# Patient Record
Sex: FEMALE | Race: BLACK OR AFRICAN AMERICAN | NOT HISPANIC OR LATINO
[De-identification: names, ages, dates, MRNs, and addresses within clinical notes are randomized per-mention and may not be internally consistent; named-entity substitution may affect disease eponyms.]

---

## 2019-03-25 ENCOUNTER — APPOINTMENT (OUTPATIENT)
Dept: INTERNAL MEDICINE | Facility: CLINIC | Age: 59
End: 2019-03-25
Payer: COMMERCIAL

## 2019-03-25 VITALS
BODY MASS INDEX: 32.72 KG/M2 | WEIGHT: 187 LBS | HEIGHT: 63.5 IN | DIASTOLIC BLOOD PRESSURE: 84 MMHG | RESPIRATION RATE: 16 BRPM | SYSTOLIC BLOOD PRESSURE: 150 MMHG

## 2019-03-25 DIAGNOSIS — L29.0 PRURITUS ANI: ICD-10-CM

## 2019-03-25 DIAGNOSIS — R23.1 PALLOR: ICD-10-CM

## 2019-03-25 PROCEDURE — 99214 OFFICE O/P EST MOD 30 MIN: CPT

## 2019-03-25 NOTE — PLAN
[FreeTextEntry1] : hydrocortisone 1% applied prn\par at home tape test at night\par \par monitor left leg rash (occurred over past few weeks)- if no improvement- f/u with us for further bw or derm

## 2019-03-25 NOTE — HISTORY OF PRESENT ILLNESS
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

## 2019-03-25 NOTE — PHYSICAL EXAM

## 2019-11-20 ENCOUNTER — MEDICATION RENEWAL (OUTPATIENT)
Age: 59
End: 2019-11-20

## 2019-11-26 ENCOUNTER — MED ADMIN CHARGE (OUTPATIENT)
Age: 59
End: 2019-11-26

## 2019-11-26 ENCOUNTER — APPOINTMENT (OUTPATIENT)
Dept: INTERNAL MEDICINE | Facility: CLINIC | Age: 59
End: 2019-11-26
Payer: COMMERCIAL

## 2019-11-26 VITALS
WEIGHT: 195.97 LBS | DIASTOLIC BLOOD PRESSURE: 99 MMHG | HEIGHT: 63.5 IN | SYSTOLIC BLOOD PRESSURE: 161 MMHG | RESPIRATION RATE: 16 BRPM | TEMPERATURE: 98.2 F | BODY MASS INDEX: 34.29 KG/M2 | OXYGEN SATURATION: 100 % | HEART RATE: 77 BPM

## 2019-11-26 VITALS — SYSTOLIC BLOOD PRESSURE: 148 MMHG | DIASTOLIC BLOOD PRESSURE: 90 MMHG

## 2019-11-26 DIAGNOSIS — Z23 ENCOUNTER FOR IMMUNIZATION: ICD-10-CM

## 2019-11-26 PROCEDURE — 99214 OFFICE O/P EST MOD 30 MIN: CPT | Mod: 25

## 2019-11-26 PROCEDURE — 90686 IIV4 VACC NO PRSV 0.5 ML IM: CPT

## 2019-11-26 PROCEDURE — 36415 COLL VENOUS BLD VENIPUNCTURE: CPT

## 2019-11-26 PROCEDURE — 90471 IMMUNIZATION ADMIN: CPT

## 2019-11-26 NOTE — HISTORY OF PRESENT ILLNESS
[Hypertension] : Hypertension [FreeTextEntry6] : pt diagnosed with left hip OA in summer [Checks BP Sporadically] : The patient checks ~his/her~ blood pressure sporadically [<130/90] : Target blood pressure is  <130/90 [de-identified] : 160/90 [Target goal not met] : BP target goal not met

## 2019-12-02 LAB
ALBUMIN SERPL ELPH-MCNC: 4.3 G/DL
ALP BLD-CCNC: 103 U/L
ALT SERPL-CCNC: 15 U/L
ANION GAP SERPL CALC-SCNC: 12 MMOL/L
AST SERPL-CCNC: 15 U/L
BILIRUB SERPL-MCNC: 0.2 MG/DL
BUN SERPL-MCNC: 15 MG/DL
CALCIUM SERPL-MCNC: 10 MG/DL
CHLORIDE SERPL-SCNC: 104 MMOL/L
CO2 SERPL-SCNC: 26 MMOL/L
CREAT SERPL-MCNC: 0.98 MG/DL
GLUCOSE SERPL-MCNC: 97 MG/DL
POTASSIUM SERPL-SCNC: 4.6 MMOL/L
PROT SERPL-MCNC: 6.7 G/DL
SODIUM SERPL-SCNC: 142 MMOL/L

## 2020-06-10 ENCOUNTER — APPOINTMENT (OUTPATIENT)
Dept: INTERNAL MEDICINE | Facility: CLINIC | Age: 60
End: 2020-06-10
Payer: COMMERCIAL

## 2020-06-10 VITALS
TEMPERATURE: 98.7 F | WEIGHT: 191 LBS | HEART RATE: 75 BPM | HEIGHT: 63.5 IN | OXYGEN SATURATION: 99 % | BODY MASS INDEX: 33.42 KG/M2

## 2020-06-10 VITALS — DIASTOLIC BLOOD PRESSURE: 84 MMHG | SYSTOLIC BLOOD PRESSURE: 148 MMHG

## 2020-06-10 DIAGNOSIS — Z11.59 ENCOUNTER FOR SCREENING FOR OTHER VIRAL DISEASES: ICD-10-CM

## 2020-06-10 PROCEDURE — 36415 COLL VENOUS BLD VENIPUNCTURE: CPT

## 2020-06-10 PROCEDURE — G0444 DEPRESSION SCREEN ANNUAL: CPT

## 2020-06-10 PROCEDURE — 99214 OFFICE O/P EST MOD 30 MIN: CPT | Mod: 25

## 2020-06-10 NOTE — ASSESSMENT
[FreeTextEntry1] : reviewed preventive care guidelines with pt including mammo/gyn and colonoscopy. Refuses DEXA\par cpx 6 months/labs ordered including covid ab\par pt wont switch bp meds--i advised her she may do better with another class of anti-htn meds--wants to check and see what her mother is taking\par increase vasotec to 20mg/day

## 2020-06-10 NOTE — HISTORY OF PRESENT ILLNESS
[FreeTextEntry1] : htn [de-identified] : vasotec increased 11/19 to 10mg\par bps still 140-150's\par has hx fluctuating bps\par saw ortho for left hip djd--obed\par had uri symps early last yr

## 2020-06-12 ENCOUNTER — NON-APPOINTMENT (OUTPATIENT)
Age: 60
End: 2020-06-12

## 2020-06-12 ENCOUNTER — APPOINTMENT (OUTPATIENT)
Dept: CARDIOLOGY | Facility: CLINIC | Age: 60
End: 2020-06-12
Payer: COMMERCIAL

## 2020-06-12 VITALS
HEART RATE: 92 BPM | DIASTOLIC BLOOD PRESSURE: 92 MMHG | RESPIRATION RATE: 16 BRPM | OXYGEN SATURATION: 98 % | HEIGHT: 63.5 IN | BODY MASS INDEX: 33.26 KG/M2 | WEIGHT: 190.06 LBS | SYSTOLIC BLOOD PRESSURE: 160 MMHG | TEMPERATURE: 98.8 F

## 2020-06-12 VITALS — DIASTOLIC BLOOD PRESSURE: 84 MMHG | SYSTOLIC BLOOD PRESSURE: 160 MMHG

## 2020-06-12 DIAGNOSIS — Z82.49 FAMILY HISTORY OF ISCHEMIC HEART DISEASE AND OTHER DISEASES OF THE CIRCULATORY SYSTEM: ICD-10-CM

## 2020-06-12 LAB
ABO + RH PNL BLD: NORMAL
ALBUMIN SERPL ELPH-MCNC: 4.2 G/DL
ALP BLD-CCNC: 106 U/L
ALT SERPL-CCNC: 11 U/L
ANION GAP SERPL CALC-SCNC: 13 MMOL/L
AST SERPL-CCNC: 12 U/L
BASOPHILS # BLD AUTO: 0.06 K/UL
BASOPHILS NFR BLD AUTO: 1.1 %
BILIRUB SERPL-MCNC: 0.3 MG/DL
BUN SERPL-MCNC: 10 MG/DL
CALCIUM SERPL-MCNC: 9.9 MG/DL
CHLORIDE SERPL-SCNC: 104 MMOL/L
CHOLEST SERPL-MCNC: 266 MG/DL
CHOLEST/HDLC SERPL: 5.6 RATIO
CO2 SERPL-SCNC: 25 MMOL/L
CREAT SERPL-MCNC: 0.82 MG/DL
EOSINOPHIL # BLD AUTO: 0.14 K/UL
EOSINOPHIL NFR BLD AUTO: 2.6 %
ESTIMATED AVERAGE GLUCOSE: 131 MG/DL
GLUCOSE SERPL-MCNC: 100 MG/DL
HBA1C MFR BLD HPLC: 6.2 %
HCT VFR BLD CALC: 46.9 %
HDLC SERPL-MCNC: 48 MG/DL
HGB BLD-MCNC: 14.2 G/DL
IMM GRANULOCYTES NFR BLD AUTO: 0.2 %
LDLC SERPL CALC-MCNC: 184 MG/DL
LYMPHOCYTES # BLD AUTO: 0.88 K/UL
LYMPHOCYTES NFR BLD AUTO: 16.6 %
MAN DIFF?: NORMAL
MCHC RBC-ENTMCNC: 24.6 PG
MCHC RBC-ENTMCNC: 30.3 GM/DL
MCV RBC AUTO: 81.1 FL
MONOCYTES # BLD AUTO: 0.57 K/UL
MONOCYTES NFR BLD AUTO: 10.8 %
NEUTROPHILS # BLD AUTO: 3.64 K/UL
NEUTROPHILS NFR BLD AUTO: 68.7 %
PLATELET # BLD AUTO: 222 K/UL
POTASSIUM SERPL-SCNC: 4.4 MMOL/L
PROT SERPL-MCNC: 6.8 G/DL
RBC # BLD: 5.78 M/UL
RBC # FLD: 15.4 %
SARS-COV-2 IGG SERPL IA-ACNC: 1.65 INDEX
SARS-COV-2 IGG SERPL QL IA: POSITIVE
SODIUM SERPL-SCNC: 142 MMOL/L
TRIGL SERPL-MCNC: 169 MG/DL
TSH SERPL-ACNC: 1.02 UIU/ML
WBC # FLD AUTO: 5.3 K/UL

## 2020-06-12 PROCEDURE — 99203 OFFICE O/P NEW LOW 30 MIN: CPT

## 2020-06-12 PROCEDURE — 93000 ELECTROCARDIOGRAM COMPLETE: CPT

## 2020-06-12 NOTE — REVIEW OF SYSTEMS
[Joint Pain] : joint pain [Skin: A Rash] : no rash: [Skin Lesions] : no skin lesions [Convulsions] : no convulsions [Dizziness] : no dizziness [Confusion] : no confusion was observed [Anxiety] : no anxiety [Excessive Thirst] : no polydipsia [Easy Bruising] : no tendency for easy bruising [Easy Bleeding] : no tendency for easy bleeding [Negative] : Gastrointestinal

## 2020-06-12 NOTE — REASON FOR VISIT
[FreeTextEntry1] : 60F HTN, pre-DM (A1c 6.2) who presents for cardiac evaluation.\par \par She states her BP has been continually elevated while on enelapril 10mg daily. Prescribed 20mg but has not yet started new dose.\par Is interested in learning more about alternatives or other antihypertensives.\par \par Of note, EMMANUEL sx in 9/2019 and had a cough end of December 2019 that persisted for months.\par COVID Ab positive\par \par \par notes poor eating habits and reduced physical activity since pandemic hit\par denies CP, SOB, HEARN, palpitations, LE edema\par \par Chol 266//HDL 48/

## 2020-06-12 NOTE — ASSESSMENT
[FreeTextEntry1] : HTN - suboptimal control.\par -start enalapril 20mg daily as previously prescribed\par -pt interested in starting a potassium sparing diuretic as the side effect seems more favorable to her. start spironolactone 25mg daily. as her acei is being increased and an MRA is being initiated, will have her BMP checked in approximately four days\par -we discussed lifestlye changes, including adhering to a DASH-type of diet with increase in whole foods, fruits, vegetables, and decreasing saturated fats and red meats. Increase physical activity to 150 minutes of moderate intensity exercise weekly.\par -Pt to keep BP log - will check in two weeks for medication titration\par \par HLD - healthy lifestyle changes as above. statin is indicated. pt states has not been an issue in the past. will attempt medical management prior to pharmacotherapy.\par

## 2020-06-12 NOTE — PHYSICAL EXAM
[Normal Appearance] : normal appearance [Eyelids - No Xanthelasma] : the eyelids demonstrated no xanthelasmas [General Appearance - In No Acute Distress] : no acute distress [Normal Jugular Venous A Waves Present] : normal jugular venous A waves present [No Oral Pallor] : no oral pallor [No Oral Cyanosis] : no oral cyanosis [Heart Rate And Rhythm] : heart rate and rhythm were normal [Normal Jugular Venous V Waves Present] : normal jugular venous V waves present [No Jugular Venous Polk A Waves] : no jugular venous polk A waves [Arterial Pulses Normal] : the arterial pulses were normal [Heart Sounds] : normal S1 and S2 [Murmurs] : no murmurs present [Auscultation Breath Sounds / Voice Sounds] : lungs were clear to auscultation bilaterally [Edema] : no peripheral edema present [] : no respiratory distress [Respiration, Rhythm And Depth] : normal respiratory rhythm and effort [Abdomen Soft] : soft [Abdomen Tenderness] : non-tender [Cyanosis, Localized] : no localized cyanosis [Skin Turgor] : normal skin turgor [Nail Clubbing] : no clubbing of the fingernails [Skin Color & Pigmentation] : normal skin color and pigmentation [Oriented To Time, Place, And Person] : oriented to person, place, and time [Impaired Insight] : insight and judgment were intact

## 2020-06-18 ENCOUNTER — MESSAGE (OUTPATIENT)
Age: 60
End: 2020-06-18

## 2020-06-18 LAB
ANION GAP SERPL CALC-SCNC: 13 MMOL/L
BUN SERPL-MCNC: 16 MG/DL
CALCIUM SERPL-MCNC: 10.5 MG/DL
CHLORIDE SERPL-SCNC: 101 MMOL/L
CO2 SERPL-SCNC: 26 MMOL/L
CREAT SERPL-MCNC: 0.89 MG/DL
GLUCOSE SERPL-MCNC: 92 MG/DL
POTASSIUM SERPL-SCNC: 4.4 MMOL/L
SODIUM SERPL-SCNC: 140 MMOL/L

## 2020-06-24 ENCOUNTER — APPOINTMENT (OUTPATIENT)
Dept: CARDIOLOGY | Facility: CLINIC | Age: 60
End: 2020-06-24
Payer: COMMERCIAL

## 2020-06-24 VITALS
RESPIRATION RATE: 17 BRPM | OXYGEN SATURATION: 98 % | TEMPERATURE: 98.5 F | HEART RATE: 73 BPM | SYSTOLIC BLOOD PRESSURE: 155 MMHG | WEIGHT: 190 LBS | DIASTOLIC BLOOD PRESSURE: 89 MMHG | HEIGHT: 63 IN | BODY MASS INDEX: 33.66 KG/M2

## 2020-06-24 PROCEDURE — 99213 OFFICE O/P EST LOW 20 MIN: CPT

## 2020-06-24 NOTE — PHYSICAL EXAM
[General Appearance - In No Acute Distress] : no acute distress [Normal Appearance] : normal appearance [Eyelids - No Xanthelasma] : the eyelids demonstrated no xanthelasmas [No Oral Pallor] : no oral pallor [Normal Jugular Venous A Waves Present] : normal jugular venous A waves present [No Oral Cyanosis] : no oral cyanosis [Normal Jugular Venous V Waves Present] : normal jugular venous V waves present [No Jugular Venous Polk A Waves] : no jugular venous polk A waves [] : no respiratory distress [Respiration, Rhythm And Depth] : normal respiratory rhythm and effort [Heart Rate And Rhythm] : heart rate and rhythm were normal [Auscultation Breath Sounds / Voice Sounds] : lungs were clear to auscultation bilaterally [Heart Sounds] : normal S1 and S2 [Murmurs] : no murmurs present [Arterial Pulses Normal] : the arterial pulses were normal [Edema] : no peripheral edema present [Abdomen Soft] : soft [Abdomen Tenderness] : non-tender [Nail Clubbing] : no clubbing of the fingernails [Cyanosis, Localized] : no localized cyanosis [Skin Turgor] : normal skin turgor [Skin Color & Pigmentation] : normal skin color and pigmentation [Impaired Insight] : insight and judgment were intact [Oriented To Time, Place, And Person] : oriented to person, place, and time

## 2020-06-24 NOTE — REVIEW OF SYSTEMS
[Joint Pain] : joint pain [Dizziness] : no dizziness [Skin: A Rash] : no rash: [Skin Lesions] : no skin lesions [Anxiety] : no anxiety [Convulsions] : no convulsions [Confusion] : no confusion was observed [Easy Bruising] : no tendency for easy bruising [Easy Bleeding] : no tendency for easy bleeding [Excessive Thirst] : no polydipsia [Negative] : Genitourinary

## 2020-06-24 NOTE — REASON FOR VISIT
[FreeTextEntry1] : 60F HTN, pre-DM (A1c 6.2) who presents for BP management.\par \par Currently on enalapril 20mg and spironolactone 25mg daily. No hyperkalemia on BMP.\par \par \par notes poor eating habits and reduced physical activity since pandemic hit. she is currently making healthy lifestyle changes but is finding difficulty with increasing physical activity due to hip pain.\par denies CP, SOB, HEARN, palpitations, LE edema\par \par Chol 266//HDL 48/

## 2020-06-24 NOTE — ASSESSMENT
[FreeTextEntry1] : HTN - BP log reviewed. suboptimal control, though overall improvement since uptitrating enalapril and addition of spironolactone. noted elevations during stressful situations.\par -cont enalapril 20mg daily, spironolactone 25mg daily (defer uptitration due to dry mouth in AM)\par -start carvedilol 6.25mg BID with plan to increase dose as tolerated and potentially stop acei. when dose is stable, will consider switch to long-acting formulation\par -reinforced DASH-type of diet with increase in whole foods, fruits, vegetables, and decreasing saturated fats and red meats. sodium restriction to 1700mg daily. Increase physical activity to 150 minutes of moderate intensity exercise weekly as tolerated - will try stationary bicycle.\par -Pt to keep BP log - check in two weeks\par \par HLD - healthy lifestyle changes as above. statin is indicated. pt states has not been an issue in the past. will attempt medical management prior to pharmacotherapy.\par

## 2020-09-28 ENCOUNTER — RX RENEWAL (OUTPATIENT)
Age: 60
End: 2020-09-28

## 2020-10-26 ENCOUNTER — RX RENEWAL (OUTPATIENT)
Age: 60
End: 2020-10-26

## 2020-12-10 ENCOUNTER — APPOINTMENT (OUTPATIENT)
Dept: INTERNAL MEDICINE | Facility: CLINIC | Age: 60
End: 2020-12-10

## 2021-01-03 ENCOUNTER — RX RENEWAL (OUTPATIENT)
Age: 61
End: 2021-01-03

## 2021-01-27 ENCOUNTER — RX RENEWAL (OUTPATIENT)
Age: 61
End: 2021-01-27

## 2021-02-11 ENCOUNTER — RX RENEWAL (OUTPATIENT)
Age: 61
End: 2021-02-11

## 2021-03-15 ENCOUNTER — APPOINTMENT (OUTPATIENT)
Dept: CARDIOLOGY | Facility: CLINIC | Age: 61
End: 2021-03-15
Payer: COMMERCIAL

## 2021-03-15 VITALS
OXYGEN SATURATION: 99 % | SYSTOLIC BLOOD PRESSURE: 139 MMHG | TEMPERATURE: 98.5 F | HEIGHT: 63 IN | DIASTOLIC BLOOD PRESSURE: 86 MMHG | WEIGHT: 195 LBS | BODY MASS INDEX: 34.55 KG/M2 | RESPIRATION RATE: 17 BRPM | HEART RATE: 64 BPM

## 2021-03-15 PROCEDURE — 99213 OFFICE O/P EST LOW 20 MIN: CPT

## 2021-03-15 PROCEDURE — 99072 ADDL SUPL MATRL&STAF TM PHE: CPT

## 2021-03-15 NOTE — ASSESSMENT
[FreeTextEntry1] : HTN - generally controlled with excursions. meds renewed\par HTN is a preexisting condition identified by the CDC as placing the pt at risk of severe COVID-19 - recommend vaccine as it is being offered by her employer\par \par HLD - healthy lifestyle changes as above. statin is indicated. pt states has not been an issue in the past. will attempt medical management prior to pharmacotherapy.\par \par hip pain - avoid NSAIDS when possible. consider turmeric supplement to aid with inflammation\par

## 2021-03-15 NOTE — REASON FOR VISIT
[FreeTextEntry1] : 60F HTN, pre-DM (A1c 6.2) who presents for BP management follow-up\par \par Currently on enalapril 20mg, spironolactone 25mg, carvedilol 12.5mgBID.\par \par BP's are generally better when eating heart healthy diet.\par \par \par making healthy lifestyle changes but is finding difficulty with increasing physical activity due to hip pain.\par denies CP, SOB, HEARN, palpitations, LE edema\par \par 6/10/2020\par Chol 266//HDL 48/

## 2021-03-15 NOTE — REVIEW OF SYSTEMS
[Joint Pain] : joint pain [Negative] : Genitourinary [Skin: A Rash] : no rash: [Skin Lesions] : no skin lesions [Dizziness] : no dizziness [Convulsions] : no convulsions [Confusion] : no confusion was observed [Anxiety] : no anxiety [Excessive Thirst] : no polydipsia [Easy Bleeding] : no tendency for easy bleeding [Easy Bruising] : no tendency for easy bruising

## 2021-03-15 NOTE — PHYSICAL EXAM
[Normal Appearance] : normal appearance [General Appearance - In No Acute Distress] : no acute distress [Eyelids - No Xanthelasma] : the eyelids demonstrated no xanthelasmas [No Oral Cyanosis] : no oral cyanosis [Normal Jugular Venous A Waves Present] : normal jugular venous A waves present [Normal Jugular Venous V Waves Present] : normal jugular venous V waves present [No Jugular Venous Polk A Waves] : no jugular venous polk A waves [] : no respiratory distress [Respiration, Rhythm And Depth] : normal respiratory rhythm and effort [Auscultation Breath Sounds / Voice Sounds] : lungs were clear to auscultation bilaterally [Heart Rate And Rhythm] : heart rate and rhythm were normal [Heart Sounds] : normal S1 and S2 [Murmurs] : no murmurs present [Arterial Pulses Normal] : the arterial pulses were normal [Edema] : no peripheral edema present [Abdomen Soft] : soft [Abdomen Tenderness] : non-tender [Nail Clubbing] : no clubbing of the fingernails [Cyanosis, Localized] : no localized cyanosis [Skin Color & Pigmentation] : normal skin color and pigmentation [Skin Turgor] : normal skin turgor [Oriented To Time, Place, And Person] : oriented to person, place, and time [Impaired Insight] : insight and judgment were intact

## 2021-07-04 ENCOUNTER — RX RENEWAL (OUTPATIENT)
Age: 61
End: 2021-07-04

## 2021-09-16 ENCOUNTER — APPOINTMENT (OUTPATIENT)
Dept: CARDIOLOGY | Facility: CLINIC | Age: 61
End: 2021-09-16
Payer: COMMERCIAL

## 2021-09-16 VITALS — DIASTOLIC BLOOD PRESSURE: 70 MMHG | SYSTOLIC BLOOD PRESSURE: 122 MMHG

## 2021-09-16 VITALS
RESPIRATION RATE: 17 BRPM | DIASTOLIC BLOOD PRESSURE: 86 MMHG | SYSTOLIC BLOOD PRESSURE: 144 MMHG | HEIGHT: 63 IN | WEIGHT: 187 LBS | OXYGEN SATURATION: 100 % | BODY MASS INDEX: 33.13 KG/M2 | TEMPERATURE: 98.7 F | HEART RATE: 68 BPM

## 2021-09-16 DIAGNOSIS — T50.905A OTHER SPECIFIED NONSCARRING HAIR LOSS: ICD-10-CM

## 2021-09-16 DIAGNOSIS — L65.8 OTHER SPECIFIED NONSCARRING HAIR LOSS: ICD-10-CM

## 2021-09-16 PROCEDURE — 99213 OFFICE O/P EST LOW 20 MIN: CPT

## 2021-09-16 RX ORDER — CARVEDILOL 12.5 MG/1
12.5 TABLET, FILM COATED ORAL TWICE DAILY
Qty: 60 | Refills: 5 | Status: DISCONTINUED | COMMUNITY
Start: 2020-06-24 | End: 2021-09-16

## 2021-09-16 RX ORDER — ENALAPRIL MALEATE 20 MG/1
20 TABLET ORAL
Qty: 90 | Refills: 1 | Status: DISCONTINUED | COMMUNITY
Start: 2018-12-18 | End: 2021-09-16

## 2021-09-16 NOTE — DISCUSSION/SUMMARY
[FreeTextEntry1] : HTN - with possible hair loss due to carvedilol\par -cont spironolactone 25mg daily\par -stop carvedilol.\par -had dry cough possibly due to higher doses of acei in past; change enalapril 20mg to losartan 50mg and increase to 100mg as needed\par -cont heart healthy lifestyle\par \par HLD - will check LDL with ongoing healthy lifestyle and address indication for statin at that teetee

## 2021-09-16 NOTE — PHYSICAL EXAM
[Soft] : abdomen soft [Non Tender] : non-tender [Abnormal Gait] : abnormal gait [Normal] : alert and oriented, normal memory

## 2021-09-16 NOTE — REASON FOR VISIT
[FreeTextEntry1] : 61F HTN, pre-DM (A1c 6.2) who presents for BP management follow-up\par \par COVID vaccinated April 2021\par \par Lost weight eating less processed foods.\par hip pain still present\par Stopped carvedilol as frequently due to hair loss and now with elevated BP's\par \par , special needs\par \par \par 6/10/2020\par Chol 266//HDL 48/

## 2021-10-02 RX ORDER — LOSARTAN POTASSIUM 100 MG/1
100 TABLET, FILM COATED ORAL DAILY
Qty: 90 | Refills: 2 | Status: DISCONTINUED | COMMUNITY
Start: 2021-09-16 | End: 2021-10-02

## 2021-10-25 ENCOUNTER — RX RENEWAL (OUTPATIENT)
Age: 61
End: 2021-10-25

## 2022-02-22 ENCOUNTER — RX RENEWAL (OUTPATIENT)
Age: 62
End: 2022-02-22

## 2022-07-27 NOTE — CARDIOLOGY SUMMARY
There are no preventive care reminders to display for this patient.    Patient is up to date, no discussion needed.     [de-identified] : 6/12/2020: HOMA

## 2022-08-03 ENCOUNTER — RX RENEWAL (OUTPATIENT)
Age: 62
End: 2022-08-03

## 2022-08-22 ENCOUNTER — RX RENEWAL (OUTPATIENT)
Age: 62
End: 2022-08-22

## 2023-03-22 ENCOUNTER — RX RENEWAL (OUTPATIENT)
Age: 63
End: 2023-03-22

## 2023-05-04 ENCOUNTER — RX RENEWAL (OUTPATIENT)
Age: 63
End: 2023-05-04

## 2023-05-31 ENCOUNTER — APPOINTMENT (OUTPATIENT)
Dept: CARDIOLOGY | Facility: CLINIC | Age: 63
End: 2023-05-31
Payer: COMMERCIAL

## 2023-05-31 ENCOUNTER — NON-APPOINTMENT (OUTPATIENT)
Age: 63
End: 2023-05-31

## 2023-05-31 VITALS
OXYGEN SATURATION: 100 % | HEIGHT: 63 IN | SYSTOLIC BLOOD PRESSURE: 148 MMHG | TEMPERATURE: 98.6 F | DIASTOLIC BLOOD PRESSURE: 99 MMHG | BODY MASS INDEX: 32.43 KG/M2 | HEART RATE: 104 BPM | WEIGHT: 183 LBS

## 2023-05-31 VITALS — SYSTOLIC BLOOD PRESSURE: 122 MMHG | DIASTOLIC BLOOD PRESSURE: 80 MMHG

## 2023-05-31 DIAGNOSIS — E78.5 HYPERLIPIDEMIA, UNSPECIFIED: ICD-10-CM

## 2023-05-31 DIAGNOSIS — M25.552 PAIN IN LEFT HIP: ICD-10-CM

## 2023-05-31 DIAGNOSIS — I10 ESSENTIAL (PRIMARY) HYPERTENSION: ICD-10-CM

## 2023-05-31 PROCEDURE — XXXXX: CPT | Mod: 1L

## 2023-05-31 RX ORDER — SPIRONOLACTONE 25 MG/1
25 TABLET ORAL
Qty: 90 | Refills: 3 | Status: ACTIVE | COMMUNITY
Start: 2020-06-12 | End: 1900-01-01

## 2023-05-31 NOTE — REASON FOR VISIT
[FreeTextEntry1] : 63F HTN, pre-DM, hip OA who presents for follow-up\par \par continues to be active\par has been using cane since 9/2022 due to hip OA however can climb four flights of stairs consecutively\par \par had issues with prior BP meds\par losartan, carvedilol - hair loss\par tolerating amlodipine and spironolactone\par \par has two month old grandson\par \par 6/10/2020\par Chol 266//HDL 48/

## 2023-05-31 NOTE — DISCUSSION/SUMMARY
[FreeTextEntry1] : HTN - controlled\par -cont spironolactone 25mg daily\par -cont amlodipine 10mg daily\par -cont heart healthy lifestyle\par \par HLD - \par we discussed potential for statin therapy - pt remains hesitant due to concern for side effects. she has  a hx of medication intolerance and will need to be monitored\par \par hip OA - likely needs surgery and appears to be a good candidate from a cardiac standpoint\par \par pt to re-est care with PCP

## 2023-06-29 ENCOUNTER — APPOINTMENT (OUTPATIENT)
Dept: INTERNAL MEDICINE | Facility: CLINIC | Age: 63
End: 2023-06-29

## 2023-07-05 ENCOUNTER — APPOINTMENT (OUTPATIENT)
Dept: INTERNAL MEDICINE | Facility: CLINIC | Age: 63
End: 2023-07-05
Payer: COMMERCIAL

## 2023-07-05 VITALS
RESPIRATION RATE: 17 BRPM | TEMPERATURE: 98.4 F | WEIGHT: 180.25 LBS | SYSTOLIC BLOOD PRESSURE: 155 MMHG | DIASTOLIC BLOOD PRESSURE: 91 MMHG | OXYGEN SATURATION: 97 % | HEART RATE: 84 BPM | HEIGHT: 63 IN | BODY MASS INDEX: 31.94 KG/M2

## 2023-07-05 VITALS — SYSTOLIC BLOOD PRESSURE: 128 MMHG | DIASTOLIC BLOOD PRESSURE: 80 MMHG

## 2023-07-05 DIAGNOSIS — Z00.00 ENCOUNTER FOR GENERAL ADULT MEDICAL EXAMINATION W/OUT ABNORMAL FINDINGS: ICD-10-CM

## 2023-07-05 PROCEDURE — 36415 COLL VENOUS BLD VENIPUNCTURE: CPT

## 2023-07-05 PROCEDURE — 99397 PER PM REEVAL EST PAT 65+ YR: CPT | Mod: 25

## 2023-07-06 DIAGNOSIS — R74.8 ABNORMAL LEVELS OF OTHER SERUM ENZYMES: ICD-10-CM

## 2023-07-06 LAB
MAGNESIUM SERPL-MCNC: 2.2 MG/DL
PHOSPHATE SERPL-MCNC: 3.2 MG/DL
TSH SERPL-ACNC: 2.15 UIU/ML

## 2023-07-06 NOTE — HEALTH RISK ASSESSMENT
[No falls in past year] : Patient reported no falls in the past year [0] : 2) Feeling down, depressed, or hopeless: Not at all (0) [PHQ-2 Negative - No further assessment needed] : PHQ-2 Negative - No further assessment needed [Patient declined mammogram] : Patient declined mammogram [Patient declined bone density test] : Patient declined bone density test [Patient declined colonoscopy] : Patient declined colonoscopy [With Family] : lives with family [Fully functional (bathing, dressing, toileting, transferring, walking, feeding)] : Fully functional (bathing, dressing, toileting, transferring, walking, feeding) [Fully functional (using the telephone, shopping, preparing meals, housekeeping, doing laundry, using] : Fully functional and needs no help or supervision to perform IADLs (using the telephone, shopping, preparing meals, housekeeping, doing laundry, using transportation, managing medications and managing finances) [Never] : Never [[id number]] : 0 [Reports changes in hearing] : Reports no changes in hearing [Reports changes in vision] : Reports no changes in vision [Reports changes in dental health] : Reports no changes in dental health

## 2023-07-06 NOTE — PLAN
[FreeTextEntry1] : Reviewed age appropriate preventive screening with patient today and importance of regular screening as indicated.\par Encouraged exercise of at least 30 mins daily of moderate activity as tolerated.  If unable to participate in moderate activity, encouraged walking daily for 20 to 30mins. Discussed healthy dietary intake of vegetables, whole grains, lean proteins with avoidance of high sugar and sodium intake.\par Completed labs in office today, will await results and notify patient accordingly\par Reviewed and assessed depression screening and mood today with patient with total \par

## 2023-07-06 NOTE — HISTORY OF PRESENT ILLNESS
[FreeTextEntry1] : CPE [de-identified] : Here for CPE\par Declines mammogram, DEXA and colonoscopy\par UTD w/ gyn\par No falls\par Offers no acute complaints\par Has had EKG this year w/ cardiology\par

## 2023-07-07 ENCOUNTER — NON-APPOINTMENT (OUTPATIENT)
Age: 63
End: 2023-07-07

## 2023-07-07 LAB
25(OH)D3 SERPL-MCNC: 15.7 NG/ML
ALBUMIN SERPL ELPH-MCNC: 4.5 G/DL
ALP BLD-CCNC: 134 U/L
ALT SERPL-CCNC: 12 U/L
ANION GAP SERPL CALC-SCNC: 13 MMOL/L
AST SERPL-CCNC: 13 U/L
BILIRUB SERPL-MCNC: 0.3 MG/DL
BUN SERPL-MCNC: 12 MG/DL
CALCIUM SERPL-MCNC: 10.3 MG/DL
CHLORIDE SERPL-SCNC: 102 MMOL/L
CHOLEST SERPL-MCNC: 278 MG/DL
CO2 SERPL-SCNC: 23 MMOL/L
CREAT SERPL-MCNC: 0.87 MG/DL
EGFR: 74 ML/MIN/1.73M2
ESTIMATED AVERAGE GLUCOSE: 128 MG/DL
GGT SERPL-CCNC: 15 U/L
GLUCOSE SERPL-MCNC: 91 MG/DL
HBA1C MFR BLD HPLC: 6.1 %
HDLC SERPL-MCNC: 48 MG/DL
LDLC SERPL CALC-MCNC: 194 MG/DL
NONHDLC SERPL-MCNC: 230 MG/DL
POTASSIUM SERPL-SCNC: 4.2 MMOL/L
PROT SERPL-MCNC: 7.2 G/DL
SODIUM SERPL-SCNC: 138 MMOL/L
TRIGL SERPL-MCNC: 180 MG/DL
VIT B12 SERPL-MCNC: 323 PG/ML

## 2023-09-19 ENCOUNTER — APPOINTMENT (OUTPATIENT)
Dept: INTERNAL MEDICINE | Facility: CLINIC | Age: 63
End: 2023-09-19

## 2023-09-19 VITALS
SYSTOLIC BLOOD PRESSURE: 152 MMHG | HEIGHT: 63 IN | DIASTOLIC BLOOD PRESSURE: 95 MMHG | OXYGEN SATURATION: 100 % | RESPIRATION RATE: 17 BRPM | HEART RATE: 98 BPM | TEMPERATURE: 98 F

## 2024-05-30 ENCOUNTER — RX RENEWAL (OUTPATIENT)
Age: 64
End: 2024-05-30

## 2024-05-30 RX ORDER — AMLODIPINE BESYLATE 10 MG/1
10 TABLET ORAL
Qty: 90 | Refills: 3 | Status: ACTIVE | COMMUNITY
Start: 2021-10-02 | End: 1900-01-01

## 2024-08-16 ENCOUNTER — RX RENEWAL (OUTPATIENT)
Age: 64
End: 2024-08-16

## 2025-06-05 ENCOUNTER — RX RENEWAL (OUTPATIENT)
Age: 65
End: 2025-06-05

## 2025-07-05 ENCOUNTER — RX RENEWAL (OUTPATIENT)
Age: 65
End: 2025-07-05

## 2025-08-30 ENCOUNTER — RX RENEWAL (OUTPATIENT)
Age: 65
End: 2025-08-30